# Patient Record
Sex: FEMALE | Race: BLACK OR AFRICAN AMERICAN | Employment: FULL TIME | URBAN - METROPOLITAN AREA
[De-identification: names, ages, dates, MRNs, and addresses within clinical notes are randomized per-mention and may not be internally consistent; named-entity substitution may affect disease eponyms.]

---

## 2017-07-14 ENCOUNTER — HOSPITAL ENCOUNTER (EMERGENCY)
Age: 52
Discharge: HOME | End: 2017-07-14
Attending: FAMILY MEDICINE

## 2017-07-14 VITALS
DIASTOLIC BLOOD PRESSURE: 83 MMHG | TEMPERATURE: 98.5 F | OXYGEN SATURATION: 99 % | HEIGHT: 65 IN | BODY MASS INDEX: 32.32 KG/M2 | WEIGHT: 194 LBS | HEART RATE: 100 BPM | RESPIRATION RATE: 16 BRPM | SYSTOLIC BLOOD PRESSURE: 137 MMHG

## 2017-07-14 DIAGNOSIS — J01.00 ACUTE NON-RECURRENT MAXILLARY SINUSITIS: Primary | ICD-10-CM

## 2017-07-14 RX ORDER — BUTALBITAL, ACETAMINOPHEN AND CAFFEINE 300; 40; 50 MG/1; MG/1; MG/1
1 CAPSULE ORAL
Qty: 20 CAP | Refills: 0 | Status: SHIPPED | OUTPATIENT
Start: 2017-07-14 | End: 2017-11-01

## 2017-07-14 RX ORDER — CEFDINIR 300 MG/1
300 CAPSULE ORAL 2 TIMES DAILY
Qty: 20 CAP | Refills: 0 | Status: SHIPPED | OUTPATIENT
Start: 2017-07-14 | End: 2017-11-01

## 2017-07-14 NOTE — UC PROVIDER NOTE
Patient is a 46 y.o. female presenting with sinus pain. The history is provided by the patient. Sinus Pain    This is a new problem. The current episode started more than 1 week ago. The problem has not changed since onset. There has been no fever. The pain is moderate. Associated symptoms include sweats, congestion and sinus pressure. Pertinent negatives include no chills, no sore throat, no swollen glands, no cough, no rhinorrhea, no neck pain, no neck pain, no headaches and no chest pain. She has tried nothing for the symptoms. History reviewed. No pertinent past medical history. Past Surgical History:   Procedure Laterality Date    HX GYN      hysterectomy    HX HYSTERECTOMY      Dr Abraham Lo         Family History   Problem Relation Age of Onset    Cancer Mother      breast    Hypertension Mother     Breast Cancer Mother     Hypertension Father     High Cholesterol Father     Cancer Paternal Aunt      cervical        Social History     Social History    Marital status: SINGLE     Spouse name: N/A    Number of children: N/A    Years of education: N/A     Occupational History    Not on file. Social History Main Topics    Smoking status: Former Smoker     Quit date: 1/2/2013    Smokeless tobacco: Not on file    Alcohol use No    Drug use: No    Sexual activity: Not on file     Other Topics Concern    Not on file     Social History Narrative                ALLERGIES: Banana; Shellfish derived; and Sulfa (sulfonamide antibiotics)    Review of Systems   Constitutional: Negative for chills. HENT: Positive for congestion and sinus pressure. Negative for rhinorrhea and sore throat. Respiratory: Negative for cough. Cardiovascular: Negative for chest pain. Musculoskeletal: Negative for neck pain. Neurological: Negative for headaches.        Vitals:    07/14/17 1840   BP: 137/83   Pulse: 100   Resp: 16   Temp: 98.5 °F (36.9 °C)   SpO2: 99%   Weight: 88 kg (194 lb)   Height: 5' 4.5\" (1.638 m)       Physical Exam   Constitutional: She is oriented to person, place, and time. She appears well-developed and well-nourished. HENT:   Right Ear: External ear normal.   Left Ear: External ear normal.   Eyes: Conjunctivae and EOM are normal.   Neck: Normal range of motion. Neck supple. Cardiovascular: Normal rate and regular rhythm. Pulmonary/Chest: Effort normal and breath sounds normal. No respiratory distress. She has no wheezes. She has no rales. She exhibits no tenderness. Musculoskeletal: Normal range of motion. Neurological: She is alert and oriented to person, place, and time. Skin: Skin is warm and dry. Psychiatric: She has a normal mood and affect. Her behavior is normal. Judgment and thought content normal.   Nursing note and vitals reviewed. MDM     Differential Diagnosis; Clinical Impression; Plan:     CLINICAL IMPRESSION:  Acute non-recurrent maxillary sinusitis  (primary encounter diagnosis)    Plan:  1. omnicef   2. fioricet  3. Risk of Significant Complications, Morbidity, and/or Mortality:   Presenting problems: Moderate  Diagnostic procedures: Moderate  Management options:   Moderate  Progress:   Patient progress:  Stable      Procedures

## 2017-07-14 NOTE — DISCHARGE INSTRUCTIONS
Sinusitis: Care Instructions  Your Care Instructions    Sinusitis is an infection of the lining of the sinus cavities in your head. Sinusitis often follows a cold. It causes pain and pressure in your head and face. In most cases, sinusitis gets better on its own in 1 to 2 weeks. But some mild symptoms may last for several weeks. Sometimes antibiotics are needed. Follow-up care is a key part of your treatment and safety. Be sure to make and go to all appointments, and call your doctor if you are having problems. It's also a good idea to know your test results and keep a list of the medicines you take. How can you care for yourself at home? · Take an over-the-counter pain medicine, such as acetaminophen (Tylenol), ibuprofen (Advil, Motrin), or naproxen (Aleve). Read and follow all instructions on the label. · If the doctor prescribed antibiotics, take them as directed. Do not stop taking them just because you feel better. You need to take the full course of antibiotics. · Be careful when taking over-the-counter cold or flu medicines and Tylenol at the same time. Many of these medicines have acetaminophen, which is Tylenol. Read the labels to make sure that you are not taking more than the recommended dose. Too much acetaminophen (Tylenol) can be harmful. · Breathe warm, moist air from a steamy shower, a hot bath, or a sink filled with hot water. Avoid cold, dry air. Using a humidifier in your home may help. Follow the directions for cleaning the machine. · Use saline (saltwater) nasal washes to help keep your nasal passages open and wash out mucus and bacteria. You can buy saline nose drops at a grocery store or drugstore. Or you can make your own at home by adding 1 teaspoon of salt and 1 teaspoon of baking soda to 2 cups of distilled water. If you make your own, fill a bulb syringe with the solution, insert the tip into your nostril, and squeeze gently. Emily Shames your nose.   · Put a hot, wet towel or a warm gel pack on your face 3 or 4 times a day for 5 to 10 minutes each time. · Try a decongestant nasal spray like oxymetazoline (Afrin). Do not use it for more than 3 days in a row. Using it for more than 3 days can make your congestion worse. When should you call for help? Call your doctor now or seek immediate medical care if:  · You have new or worse swelling or redness in your face or around your eyes. · You have a new or higher fever. Watch closely for changes in your health, and be sure to contact your doctor if:  · You have new or worse facial pain. · The mucus from your nose becomes thicker (like pus) or has new blood in it. · You are not getting better as expected. Where can you learn more? Go to http://vadim-lance.info/. Enter L409 in the search box to learn more about \"Sinusitis: Care Instructions. \"  Current as of: July 29, 2016  Content Version: 11.3  © 4046-7858 [employer]wise, Incorporated. Care instructions adapted under license by [employer] (which disclaims liability or warranty for this information). If you have questions about a medical condition or this instruction, always ask your healthcare professional. [place] [address] any warranty or liability for your use of this information.

## 2017-10-23 ENCOUNTER — HOSPITAL ENCOUNTER (OUTPATIENT)
Dept: MAMMOGRAPHY | Age: 52
Discharge: HOME | End: 2017-10-23
Attending: OBSTETRICS & GYNECOLOGY
Payer: COMMERCIAL

## 2017-10-23 DIAGNOSIS — Z12.31 VISIT FOR SCREENING MAMMOGRAM: ICD-10-CM

## 2017-10-23 PROCEDURE — 77067 SCR MAMMO BI INCL CAD: CPT

## 2018-02-15 ENCOUNTER — HOSPITAL ENCOUNTER (OUTPATIENT)
Dept: PHYSICAL THERAPY | Age: 53
Discharge: HOME | End: 2018-02-15
Payer: COMMERCIAL

## 2018-02-15 PROCEDURE — 97162 PT EVAL MOD COMPLEX 30 MIN: CPT

## 2018-02-15 PROCEDURE — 97110 THERAPEUTIC EXERCISES: CPT

## 2018-02-15 PROCEDURE — 97140 MANUAL THERAPY 1/> REGIONS: CPT

## 2018-02-15 NOTE — PROGRESS NOTES
PT INITIAL EVALUATION NOTE 2-15    Patient Name: Chan Ghosh  Date:2/15/2018  : 1965  [x]  Patient  Verified  Payor: Min Polk / Plan: Ady Leon / Product Type: PPO /    In time:12:24 PM  Out time:1:27 PM  Total Treatment Time (min): 63 minutes  Visit #: 1     Treatment Area: Low back pain [M54.5]    SUBJECTIVE  Pain Level (0-10 scale): 8/10  Any medication changes, allergies to medications, adverse drug reactions, diagnosis change, or new procedure performed?: [] No    [x] Yes (see summary sheet for update)  Subjective:     Pt referred to PT for bilateral LBP and sciatica. Pt reports primary complaints of numbness/tingling in L buttock down posterior thigh to knee with intermittent low back pain beginning again in 2017. Symptoms were exacerbated 2 weeks ago when she bent over, and could not stand back up due to L leg feeling [employer] it was out of place. \"  Aggravating factors include increased tightness first thing in the morning, standing after prolonged sitting, standing. Pt is an avid walker; legs feel tight bilaterally at beginning and loosen up after prolonged walking. 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 Relieving factors include heat in low back (min effect), stretching. X-rays show no significant abnormalities. She is scheduled to see a back and spine specialist next week. Pt lives in a 2-story house with stairs. Sleeping is occasionally affected when changing positions in bed, waking up intermittently. Pt goals include reducing pain and participating in the [facility] 10K in April. OBJECTIVE/EXAMINATION  Posture:  Slouched posture  Other Observations:  L/R Backward Sacral Torsion; Pubic dysfunction   Gait and Functional Mobility:  Genu varus bilaterally; tibial ER bilaterally    Lumbar ROM:   Flexion: Mod limitation due to tightness   Extension: Mod limitation p! Side Bending: Right: Min limitation  Left: Min limitation p!    Rotation: Right: Min-mod limitation  Left: Mod limitation    Balance Assessment: Min deficits in balance and neuromuscular control in single limb stance    Squat Assessment:   Double Leg Genu varus; quad dominant   Single Leg NT    Neurological: Sensations: Intact bilaterally with light touch (L2-S1)    Flexibility: Mod-severe restrictions in hamstrings, quadriceps, iliopsoas bilaterally L>R      LOWER QUARTER   MUSCLE STRENGTH  KEY       R  L  0 - No Contraction  Hip flex   4-/5  3+/5  1 - Trace          er    NT  NT  2 - Poor          ir   NT  NT  3 - Fair           abd  4-/5  3+/5  4 - Good          ext   4/5  3+/5  5 - Normal   Knee flex  5/5  5/5               Ext  5/5  5/5      Ankle DF  5/5  5/5                PF  5/5  5/5                INV  NT  NT                EV  NT  NT  Gluteal activation: The Pt has improper gluteal activation with hip extension bilaterally L>R    Joint Mobility Assessment: SIJ dysfunction  Palpation: TTP and increased turgor in piriformis bilaterally L>R; Increased turgor in QL and lumbar paraspinals bilaterally    Special Tests:Varus: NT     SLR: (-)    Valgus: (-)     Slump: (-)    Sergio's: (-)    Harjit: NT    Anterior Drawer: NT    Obers: NT    Posterior Drawer: NT    Clonus: NT    Lachman's: NT    Others: (+) Sitting FF test; (+) Sitting lumbar EXT test; (-) Sitting lumbar flexion test     14 min Therapeutic Exercise:  [x] See flow sheet :   Rationale: increase ROM, increase strength, improve coordination and increase proprioception to improve the patients ability to perform ADLs with decreased pain or discomfort. 11 min Manual Therapy:  Shot-gun technique; MET for L/R Backward Sacral Torsion   Rationale: decrease pain, increase ROM, increase tissue extensibility and increase postural awareness  to improve the patients ability to perform ADLs with decreased pain or discomfort.     With   [x] TE   [] TA   [] neuro   [x] other: Patient Education: [x] Review HEP    [] Progressed/Changed HEP based on:   [] positioning [] body mechanics   [] transfers   [] heat/ice application    [x] other: Educated patient regarding diagnosis, prognosis, and POC.        Other Objective/Functional Measures:  FOTO score: 47/100    Pain Level (0-10 scale) post treatment: 4/10      ASSESSMENT:      [x]  See Plan of [address] 2/15/2018  11:13 AM

## 2018-02-15 NOTE — PROGRESS NOTES
[address] [place] [address] (MOB IV), [address] [facility] [place]  [place] [address]  Phone: [phone] Fax: [phone]    Plan of Care/Statement of Necessity for Physical Therapy Services  2-15    Patient name: Terrence Sheppard  : 1965  Provider#: [id number]  Referral source: Herlinda Sarah MD      Medical/Treatment Diagnosis: Low back pain [M54.5]     Prior Hospitalization: see medical history     Comorbidities: BMI > 30  Prior Level of Function: The patient completed 20 minutes of exercise at least 3 times a week. Medications: Verified on Patient Summary List    Start of Care: 2/15/2018      Onset Date: 2017       The Plan of Care and following information is based on the information from the initial evaluation. Assessment/ key information: Patient is a pleasant 46year old female referred to skilled PT for bilateral low back pain and bilateral sciatica. Patient reports primary complaints of a numbness/tingling sensation in L buttock down posterior thigh to knee with intermittent low back pain re-appearing in 2017. Symptoms were further exacerbated 2 weeks ago attempting to extend back up after bending forward. Based on examination, patient presents with SIJ dysfunction and malalignment with radiating sciatica pain L>R and demonstrates several impairments including a left on right backward sacral torsion, significantly weak hip musculature bilaterally, decreased lumbar mobility, significantly decreased hip flexibility bilaterally (L>R). The patient would benefit from skilled physical therapy to help improve the above listed impairments to allow the patient to safely return to their prior level of function with less overall pain or risk of further injury. The patient has a good prognosis with skilled physical therapy.     Evaluation Complexity History MEDIUM  Complexity : 1-2 comorbidities / personal factors will impact the outcome/ POC ; Examination HIGH Complexity : 4+ Standardized tests and measures addressing body structure, function, activity limitation and / or participation in recreation  ;Presentation MEDIUM Complexity : Evolving with changing characteristics  ; Clinical Decision Making MEDIUM Complexity : FOTO score of 26-74  Overall Complexity Rating: MEDIUM    Problem List: pain affecting function, decrease ROM, decrease strength, edema affecting function, impaired gait/ balance, decrease ADL/ functional abilitiies, decrease activity tolerance, decrease flexibility/ joint mobility and decrease transfer abilities   Treatment Plan may include any combination of the following: Therapeutic exercise, Therapeutic activities, Neuromuscular re-education, Physical agent/modality, Gait/balance training, Manual therapy, Patient education, Self Care training, Functional mobility training, Home safety training and Stair training  Patient / Family readiness to learn indicated by: asking questions, trying to perform skills and interest  Persons(s) to be included in education: patient (P)  Barriers to Learning/Limitations: None  Patient Goal (s): Minimize pain  Patient Self Reported Health Status: good  Rehabilitation Potential: good    Short Term Goals: To be accomplished in 6 treatments:   1. Patient will be independent and compliant with HEP. Long Term Goals: To be accomplished in 12 treatments:   1. Patient will improve FOTO score by the MCII demonstrating improved overall function with decreased pain. 2. Patient will be able to perform 10 consecutive sit to stands without complaints of increased pain in order to improve functional mobility. 3. Patient will be able to stand for >=10 minutes without complaints of pain or numbness/tingling in order to better tolerate functional tasks with decreased pain. 4. Patient will be able to participate in the [facility] in April with no pain >2/10 in order to increase participation in community events.      Frequency / Duration: Patient to be seen 1 times per week for 12 treatments. Patient/ Caregiver education and instruction: self care, activity modification and exercises    []  Plan of care has been reviewed with ERIKA Castillo 2/15/2018 12:05 PM    ________________________________________________________________________    I certify that the above Therapy Services are being furnished while the patient is under my care. I agree with the treatment plan and certify that this therapy is necessary.     [de-identified] Signature:____________________  Date:____________Time: _________

## 2018-02-19 ENCOUNTER — HOSPITAL ENCOUNTER (OUTPATIENT)
Dept: PHYSICAL THERAPY | Age: 53
Discharge: HOME | End: 2018-02-19
Payer: COMMERCIAL

## 2018-02-19 PROCEDURE — 97110 THERAPEUTIC EXERCISES: CPT

## 2018-02-19 PROCEDURE — 97140 MANUAL THERAPY 1/> REGIONS: CPT

## 2018-02-19 NOTE — PROGRESS NOTES
PT DAILY TREATMENT NOTE 2-15    Patient Name: Aníbal Walden  Date:2018  : 1965  [x]  Patient  Verified  Payor: Sussy Harrison / Plan: Felicia Barclay / Product Type: PPO /    In time:4:00 PM  Out time:4:54 PM  Total Treatment Time (min): 54 minutes  Visit #: 2     Treatment Area: Low back pain [M54.5]    SUBJECTIVE  Pain Level (0-10 scale): 3/10  Any medication changes, allergies to medications, adverse drug reactions, diagnosis change, or new procedure performed?: [x] No    [] Yes (see summary sheet for update)  Subjective functional status/changes:   [] No changes reported  Patient feels much better, and states that the \"pop\" she felt with the shot-gun technique in [facility] gave her much relief. She did extensive walking (4 miles) on Saturday and went to work afterwards with very min complaints of discomfort. She was on her feet at [employer] all day on  with no complaints of pain. OBJECTIVE    39 min Therapeutic Exercise:  [x] See flow sheet :   Rationale: increase ROM, increase strength, improve coordination and increase proprioception to improve the patients ability to perform ADLs with decreased pain or discomfort. 15 min Manual Therapy:  Shot-gun technique; MET for R anteriorly rotated innominate   Rationale: decrease pain, increase ROM, increase tissue extensibility, decrease trigger points and increase postural awareness  to improve the patients ability to perform ADLs with decreased pain or discomfort.           With   [x] TE   [] TA   [] neuro   [x] other: Patient Education: [x] Review HEP    [] Progressed/Changed HEP based on:   [] positioning   [] body mechanics   [] transfers   [] heat/ice application    [x] other: Instructed patient in performing self-MET for R anteriorly rotated innominate     Other Objective/Functional Measures: None noted     Pain Level (0-10 scale) post treatment: 1/10    ASSESSMENT/Changes in Function:   Pt demonstrated R leg length > L leg length. Demonstrated improvement post-shot-gun technique, but not completely corrected alignment. Performed MET for R anteriorly rotated innominate which corrected leg length discrepancy. She was able to tolerate all exercises without any complaints of pain. Patient will continue to benefit from skilled PT services to modify and progress therapeutic interventions, address functional mobility deficits, address ROM deficits, address strength deficits, analyze and address soft tissue restrictions, analyze and cue movement patterns, analyze and modify body mechanics/ergonomics, assess and modify postural abnormalities and instruct in home and community integration to attain remaining goals. []  See Plan of Care  []  See progress note/recertification  []  See Discharge Summary         Progress towards goals / Updated goals:  Short Term Goals: To be accomplished in 6 treatments:                         1. Patient will be independent and compliant with HEP. - progressing  Long Term Goals: To be accomplished in 12 treatments:                         1. Patient will improve FOTO score by the MCII demonstrating improved overall function with decreased pain. - progressing                         2. Patient will be able to perform 10 consecutive sit to stands without complaints of increased pain in order to improve functional mobility. - progressing                         3. Patient will be able to stand for >=10 minutes without complaints of pain or numbness/tingling in order to better tolerate functional tasks with decreased pain. - progressing                         4. Patient will be able to participate in the [facility] in April with no pain >2/10 in order to increase participation in community events.   - progressing    PLAN  [x]  Upgrade activities as tolerated     [x]  Continue plan of care  [x]  Update interventions per flow sheet       []  Discharge due to:_  []  Other:_      Teresa Rodriguez Dontae 2/19/2018  2:48 PM

## 2018-02-26 ENCOUNTER — HOSPITAL ENCOUNTER (OUTPATIENT)
Dept: PHYSICAL THERAPY | Age: 53
Discharge: HOME | End: 2018-02-26
Payer: COMMERCIAL

## 2018-02-26 PROCEDURE — 97110 THERAPEUTIC EXERCISES: CPT

## 2018-02-26 PROCEDURE — 97140 MANUAL THERAPY 1/> REGIONS: CPT

## 2018-02-26 NOTE — PROGRESS NOTES
PT DAILY TREATMENT NOTE 2-15    Patient Name: Lazaro Bolden  Date:2018  : 1965  [x]  Patient  Verified  Payor: Brooke Rios / Plan: Ema Lewis / Product Type: PPO /    In time: 4:03 PM  Out time: 4:55 PM  Total Treatment Time (min): 52 minutes  Visit #: 3     Treatment Area: Low back pain [M54.5]    SUBJECTIVE  Pain Level (0-10 scale): 2/10  Any medication changes, allergies to medications, adverse drug reactions, diagnosis change, or new procedure performed?: [x] No    [] Yes (see summary sheet for update)  Subjective functional status/changes:   [] No changes reported  The Pt went to see Dr. Gregorio Valiente who per PT report said that it could possibly be muscle spasms or a pinched nerve. He prescribed muscle relaxers and Meloxicam, but she states that both medications make her incredibly tired. She states that she is still stiff in the morning, but it is less intense. She continues to have some numbness/tingling, but it is less intense and less consistent. She continues to be diligent with her HEP. OBJECTIVE    42 min Therapeutic Exercise:  [x] See flow sheet :   Rationale: increase ROM, increase strength, improve coordination, improve balance and increase proprioception to improve the patients ability to Perform ADLs with less pain or discomfort. 10 min Manual Therapy:  Shot gun technique and MET for a R anteriorly rotated innominate x2   Rationale: decrease pain, increase ROM and increase tissue extensibility  to improve the patients ability to Perform ADLs with less pain or discomfort.     With   [x] TE   [] TA   [] neuro   [] other: Patient Education: [x] Review HEP    [] Progressed/Changed HEP based on:   [] positioning   [] body mechanics   [] transfers   [] heat/ice application    [] other:      Other Objective/Functional Measures: None noted     Pain Level (0-10 scale) post treatment: 0/10    ASSESSMENT/Changes in Function:   The Pt tolerated the additions to her therapy program well without any increased pain or discomfort. She is progressing well with her core strengthening and does not fatigue as quickly. Patient will continue to benefit from skilled PT services to modify and progress therapeutic interventions, address functional mobility deficits, address ROM deficits, address strength deficits, analyze and address soft tissue restrictions, analyze and cue movement patterns, analyze and modify body mechanics/ergonomics, assess and modify postural abnormalities and instruct in home and community integration to attain remaining goals. []  See Plan of Care  []  See progress note/recertification  []  See Discharge Summary         Progress towards goals / Updated goals:  Short Term Goals: To be accomplished in 6 treatments:                         1. Patient will be independent and compliant with HEP. - progressing  Long Term Goals: To be accomplished in 12 treatments:                         1. Patient will improve FOTO score by the MCII demonstrating improved overall function with decreased pain. - progressing                         3. Patient will be able to perform 10 consecutive sit to stands without complaints of increased pain in order to improve functional mobility. - progressing                         8. Patient will be able to stand for >=10 minutes without complaints of pain or numbness/tingling in order to better tolerate functional tasks with decreased pain.   - progressing                         3. Patient will be able to participate in the [facility] in April with no pain >2/10 in order to increase participation in community events.   - progressing     PLAN  [x]  Upgrade activities as tolerated     [x]  Continue plan of care  [x]  Update interventions per flow sheet       []  Discharge due to:_  []  Other:_      Kelly Stephenson, PT 2/26/2018  4:09 PM

## 2018-03-05 ENCOUNTER — HOSPITAL ENCOUNTER (OUTPATIENT)
Dept: PHYSICAL THERAPY | Age: 53
Discharge: HOME | End: 2018-03-05
Payer: COMMERCIAL

## 2018-03-05 PROCEDURE — 97110 THERAPEUTIC EXERCISES: CPT

## 2018-03-05 PROCEDURE — 97140 MANUAL THERAPY 1/> REGIONS: CPT

## 2018-03-05 NOTE — PROGRESS NOTES
PT DAILY TREATMENT NOTE 2-15    Patient Name: Marya Ayala  Date:3/5/2018  : 1965  [x]  Patient  Verified  Payor: Kun Headley / Plan: Simone Rodas / Product Type: PPO /    In time: 4:35 PM  Out time: 5:42 PM  Total Treatment Time (min): 67 minutes  Visit #: 4     Treatment Area: Low back pain [M54.5]    SUBJECTIVE  Pain Level (0-10 scale): 0/10  Any medication changes, allergies to medications, adverse drug reactions, diagnosis change, or new procedure performed?: [x] No    [] Yes (see summary sheet for update)  Subjective functional status/changes:   [] No changes reported  The Pt reports that she has no pain during the days and only has pain when she wakes up in the morning. She has been trying to stretch more in the morning and that helps to loosen her up. She states that it always feels tight and stiff in the morning, but after being up for 5-10 minutes it loosens up and she is fine. OBJECTIVE    52 min Therapeutic Exercise:  [x] See flow sheet :   Rationale: increase ROM, increase strength, improve coordination, improve balance and increase proprioception to improve the patients ability to perform ADLs and recreational activities with less pain or discomfort. 15 min Manual Therapy:  STM and trigger point release to R piriformis using the elbow   Rationale: decrease pain, increase ROM, increase tissue extensibility and decrease trigger points  to improve the patients ability to perform ADLs and recreational activities with less pain or discomfort.           With   [x] TE   [] TA   [] neuro   [] other: Patient Education: [x] Review HEP    [] Progressed/Changed HEP based on:   [] positioning   [] body mechanics   [] transfers   [] heat/ice application    [] other:      Other Objective/Functional Measures: None noted     Pain Level (0-10 scale) post treatment: 0/10    ASSESSMENT/Changes in Function:   The Pt's alignment was found to be [facility], but there was significant turgor and discomfort in the R piriformis. She responded very well to the manual therapy and reported feeling less restricted and tight after. She is progressing well with her exercises and is able to maintain proper form and technique more easily and does not fatigue as quickly. Patient will continue to benefit from skilled PT services to modify and progress therapeutic interventions, address functional mobility deficits, address ROM deficits, address strength deficits, analyze and address soft tissue restrictions, analyze and cue movement patterns, analyze and modify body mechanics/ergonomics, assess and modify postural abnormalities and instruct in home and community integration to attain remaining goals. []  See Plan of Care  []  See progress note/recertification  []  See Discharge Summary         Progress towards goals / Updated goals:  Short Term Goals: To be accomplished in 6 treatments:                         1. Patient will be independent and compliant with HEP.  - progressing  Long Term Goals: To be accomplished in 12 treatments:                         1.  Patient will improve FOTO score by the MCII demonstrating improved overall function with decreased pain.  - progressing                         5. Patient will be able to perform 10 consecutive sit to stands without complaints of increased pain in order to improve functional mobility.  - progressing                         3. Patient will be able to stand for >=10 minutes without complaints of pain or numbness/tingling in order to better tolerate functional tasks with decreased pain.   - progressing                         1. Patient will be able to participate in the [facility] in April with no pain >2/10 in order to increase participation in community events.  - progressing     PLAN  [x]  Upgrade activities as tolerated     [x]  Continue plan of care  [x]  Update interventions per flow sheet       []  Discharge due to:_  []  Other:_ Danielito Bennett, PT 3/5/2018  5:17 PM

## 2018-03-12 ENCOUNTER — HOSPITAL ENCOUNTER (OUTPATIENT)
Dept: PHYSICAL THERAPY | Age: 53
Discharge: HOME | End: 2018-03-12
Payer: COMMERCIAL

## 2018-03-12 PROCEDURE — 97110 THERAPEUTIC EXERCISES: CPT

## 2018-03-12 PROCEDURE — 97140 MANUAL THERAPY 1/> REGIONS: CPT

## 2018-03-12 NOTE — PROGRESS NOTES
PT DAILY TREATMENT NOTE 2-15    Patient Name: Kaci Powell  Date:3/12/2018  : 1965  [x]  Patient  Verified  Payor: Shanna Avila / Plan: Angelica Danielle / Product Type: PPO /    In time: 4:10 PM  Out time: 5:18 PM  Total Treatment Time (min): 68 minutes  Visit #: 5     Treatment Area: Low back pain [M54.5]    SUBJECTIVE  Pain Level (0-10 scale): 0/10  Any medication changes, allergies to medications, adverse drug reactions, diagnosis change, or new procedure performed?: [x] No    [] Yes (see summary sheet for update)  Subjective functional status/changes:   [] No changes reported  The Pt reports that her back is feeling much better and not causing her as much discomfort with her ADLs or with work duties. She continues to report diligence with her HEP. OBJECTIVE    58 min Therapeutic Exercise:  [x] See flow sheet :   Rationale: increase ROM, increase strength, improve coordination, improve balance and increase proprioception to improve the patients ability to perform ADLs with less pain or discomfort. 10 min Manual Therapy:  Shot gun technique and MET for a R anteriorly rotated innominate   Rationale: decrease pain, increase ROM, increase tissue extensibility and decrease trigger points  to improve the patients ability to perform ADLs with less pain or discomfort. With   [x] TE   [] TA   [] neuro   [] other: Patient Education: [x] Review HEP    [] Progressed/Changed HEP based on:   [] positioning   [] body mechanics   [] transfers   [] heat/ice application    [] other:      Other Objective/Functional Measures: None noted. Pain Level (0-10 scale) post treatment: 0/10    ASSESSMENT/Changes in Function:   The Pt's innominate malalignment corrected very easy and was mild compared to previous malalignments. She is progressing well with her strengthening exercises and towards her goals. Anticipate discharge after next PT session.   Patient will continue to benefit from skilled PT services to modify and progress therapeutic interventions, address functional mobility deficits, address ROM deficits, address strength deficits, analyze and address soft tissue restrictions, analyze and cue movement patterns, analyze and modify body mechanics/ergonomics, assess and modify postural abnormalities and instruct in home and community integration to attain remaining goals. []  See Plan of Care  []  See progress note/recertification  []  See Discharge Summary         Progress towards goals / Updated goals:  Short Term Goals: To be accomplished in 6 treatments:                         1. Patient will be independent and compliant with HEP.  - progressing  Long Term Goals: To be accomplished in 12 treatments:                         1.  Patient will improve FOTO score by the MCII demonstrating improved overall function with decreased pain.  - progressing                         2. Patient will be able to perform 10 consecutive sit to stands without complaints of increased pain in order to improve functional mobility.  - progressing                         3. Patient will be able to stand for >=10 minutes without complaints of pain or numbness/tingling in order to better tolerate functional tasks with decreased pain.   - progressing                         0. Patient will be able to participate in the [facility] in April with no pain >2/10 in order to increase participation in community events.  - progressing     PLAN  [x]  Upgrade activities as tolerated     [x]  Continue plan of care  [x]  Update interventions per flow sheet       []  Discharge due to:_  []  Other:_      Mere Ralph PT 3/12/2018  2:55 PM

## 2018-03-19 ENCOUNTER — HOSPITAL ENCOUNTER (OUTPATIENT)
Dept: PHYSICAL THERAPY | Age: 53
Discharge: HOME | End: 2018-03-19
Payer: COMMERCIAL

## 2018-03-19 PROCEDURE — 97140 MANUAL THERAPY 1/> REGIONS: CPT

## 2018-03-19 PROCEDURE — 97110 THERAPEUTIC EXERCISES: CPT

## 2018-03-19 NOTE — ANCILLARY DISCHARGE INSTRUCTIONS
[facility]  [address] [place]  Phone: [phone] Fax: [phone]    Discharge Summary  2-15    Patient name: Elise Barnett  : 1965  Provider#: [id number]  Referral source: Renetta Christian MD      Medical/Treatment Diagnosis: Low back pain [M54.5]     Prior Hospitalization: see medical history     Comorbidities: See Plan of Care  Prior Level of Function:See Plan of Care  Medications: Verified on Patient Summary List    Start of Care: 2/15/18      Onset Date: 2017   Visits from Start of Care: 6     Missed Visits: 0  Reporting Period : 2/15/18 to 3/19/18      ASSESSMENT/SUMMARY OF CARE: The Pt has been seen for 6 outpatient physical therapy sessions with the diagnosis of bilateral LBP with bilateral sciatica. The Pt has met all of her therapeutic goals and progressed very well with her therapy program that focused on improving her hip strength and stability, improving her core strength and stability, correcting her innominate and sacral alignments, reducing the turgor and restrictions in her R piriformis, stretching her lower back and hip musculature, improving her balance and neuromuscular control, and improving her activity tolerance and endurance via therapeutic exercises and manual therapy techniques. The Pt has been able to return to walking for recreation without any pain or discomfort and is now able to walk up to 5 miles without discomfort. She is able to perform all ADLs and work duties without pain or discomfort. She denies any functional limitations and overall believes that she is 99% improved since beginning therapy. At this time, it is believed that the Pt has reached maximum benefit from skilled PT and will continue to progress well independently. The Pt was educated how to safely progress her HEP and voiced understanding.   The Pt is discharged from skilled PT and will contact her referring provider with any further questions or concerns. Thank you for this referral.    Progress towards goals / Updated goals:  Short Term Goals: To be accomplished in 6 treatments:                         1. Patient will be independent and compliant with HEP.  - met  Long Term Goals: To be accomplished in 12 treatments:                         1.  Patient will improve FOTO score by the MCII demonstrating improved overall function with decreased pain- met (FOTO 94/100 at discharge                         2. Patient will be able to perform 10 consecutive sit to stands without complaints of increased pain in order to improve functional mobility.  - met                         3. Patient will be able to stand for >=10 minutes without complaints of pain or numbness/tingling in order to better tolerate functional tasks with decreased pain.   - met                         2. Patient will be able to participate in the [facility] in April with no pain >2/10 in order to increase participation in community events.  - met (able to walk 5 miles without pain or discomfort)     RECOMMENDATIONS:  [x]Discontinue therapy: [x]Patient has reached or is progressing toward set goals      []Patient is non-compliant or has abdicated      []Due to lack of appreciable progress towards set goals      []Other    Júnior Whitmore, PT 3/19/2018 7:32 PM

## 2018-03-19 NOTE — PROGRESS NOTES
PT DAILY TREATMENT NOTE 2-15    Patient Name: Eliza Mckenzie  Date:3/19/2018  : 1965  [x]  Patient  Verified  Payor: Darren Zayas / Plan: Lisa Vincent / Product Type: PPO /    In time: 3:57 PM  Out time: 5:05 PM  Total Treatment Time (min): 68 minutes  Visit #: 6     Treatment Area: Low back pain [M54.5]    SUBJECTIVE  Pain Level (0-10 scale): 0/10  Any medication changes, allergies to medications, adverse drug reactions, diagnosis change, or new procedure performed?: [x] No    [] Yes (see summary sheet for update)  Subjective functional status/changes:   [] No changes reported  The Pt reports that she has been sick for the last week with a sinus infection. Despite being sick, she has not had any LBP or R piriformis pain or discomfort. She was able to walk for 4 miles straight without any pain or discomfort. She believes that she is overall 99% improved since beginning therapy. OBJECTIVE    58 min Therapeutic Exercise:  [x] See flow sheet :   Rationale: increase ROM, increase strength, improve coordination, improve balance and increase proprioception to improve the patients ability to perform ADLs with less pain or discomfort. 10 min Manual Therapy:  STM and trigger point release to R piriformis using the elbow   Rationale: decrease pain, increase ROM, increase tissue extensibility and decrease trigger points  to improve the patients ability to perform ADLs with less pain or discomfort.     With   [x] TE   [] TA   [] neuro   [x] other: Patient Education: [x] Review HEP    [] Progressed/Changed HEP based on:   [] positioning   [] body mechanics   [] transfers   [] heat/ice application    [x] other: Pt educated how to safely progress her HEP independently      Other Objective/Functional Measures:  FOTO 94/100 (47/100 at initial evaluation)     Pain Level (0-10 scale) post treatment: 0/10    ASSESSMENT/Changes in Function:      []  See Plan of Care  []  See progress note/recertification  [x]  See Discharge Summary         Progress towards goals / Updated goals:  Short Term Goals: To be accomplished in 6 treatments:                         1. Patient will be independent and compliant with HEP.  - met  Long Term Goals: To be accomplished in 12 treatments:                         1.  Patient will improve FOTO score by the MCII demonstrating improved overall function with decreased pain- met (FOTO 94/100 at discharge                         2. Patient will be able to perform 10 consecutive sit to stands without complaints of increased pain in order to improve functional mobility.  - met                         3. Patient will be able to stand for >=10 minutes without complaints of pain or numbness/tingling in order to better tolerate functional tasks with decreased pain.   - met                         9. Patient will be able to participate in the [facility] in April with no pain >2/10 in order to increase participation in community events.  - met (able to walk 5 miles without pain or discomfort)     PLAN  []  Upgrade activities as tolerated     []  Continue plan of care  []  Update interventions per flow sheet       [x]  Discharge due to: Pt has met therapeutic goals  []  Other:_      Diane La PT 3/19/2018  2:57 PM

## 2018-10-24 ENCOUNTER — HOSPITAL ENCOUNTER (OUTPATIENT)
Dept: MAMMOGRAPHY | Age: 53
Discharge: HOME | End: 2018-10-24
Attending: FAMILY MEDICINE
Payer: COMMERCIAL

## 2018-10-24 DIAGNOSIS — Z12.39 SCREENING BREAST EXAMINATION: ICD-10-CM

## 2018-10-24 PROCEDURE — 77063 BREAST TOMOSYNTHESIS BI: CPT

## 2019-02-05 PROBLEM — E66.01 SEVERE OBESITY (HCC): Status: ACTIVE | Noted: 2019-02-05

## 2019-02-11 ENCOUNTER — HOSPITAL ENCOUNTER (OUTPATIENT)
Dept: MRI IMAGING | Age: 54
Discharge: HOME | End: 2019-02-11
Attending: FAMILY MEDICINE
Payer: COMMERCIAL

## 2019-02-11 DIAGNOSIS — M54.42 ACUTE MIDLINE LOW BACK PAIN WITH LEFT-SIDED SCIATICA: ICD-10-CM

## 2019-02-11 PROCEDURE — 72148 MRI LUMBAR SPINE W/O DYE: CPT

## 2019-10-25 ENCOUNTER — HOSPITAL ENCOUNTER (OUTPATIENT)
Dept: MAMMOGRAPHY | Age: 54
Discharge: HOME | End: 2019-10-25
Attending: FAMILY MEDICINE
Payer: COMMERCIAL

## 2019-10-25 DIAGNOSIS — Z12.31 VISIT FOR SCREENING MAMMOGRAM: ICD-10-CM

## 2019-10-25 PROCEDURE — 77067 SCR MAMMO BI INCL CAD: CPT

## 2019-12-15 ENCOUNTER — OFFICE VISIT (OUTPATIENT)
Dept: URGENT CARE | Age: 54
End: 2019-12-15

## 2020-10-05 ENCOUNTER — TRANSCRIBE ORDER (OUTPATIENT)
Dept: SCHEDULING | Age: 55
End: 2020-10-05

## 2020-10-05 DIAGNOSIS — Z12.31 VISIT FOR SCREENING MAMMOGRAM: Primary | ICD-10-CM

## 2020-10-26 ENCOUNTER — HOSPITAL ENCOUNTER (OUTPATIENT)
Dept: MAMMOGRAPHY | Age: 55
Discharge: HOME | End: 2020-10-26
Attending: FAMILY MEDICINE
Payer: COMMERCIAL

## 2020-10-26 DIAGNOSIS — Z12.31 VISIT FOR SCREENING MAMMOGRAM: ICD-10-CM

## 2020-10-26 PROCEDURE — 77063 BREAST TOMOSYNTHESIS BI: CPT

## 2021-10-13 ENCOUNTER — HOSPITAL ENCOUNTER (OUTPATIENT)
Dept: MAMMOGRAPHY | Age: 56
Discharge: HOME | End: 2021-10-13
Attending: FAMILY MEDICINE
Payer: COMMERCIAL

## 2021-10-13 DIAGNOSIS — Z12.39 BREAST CANCER SCREENING: ICD-10-CM

## 2021-10-13 PROCEDURE — 77063 BREAST TOMOSYNTHESIS BI: CPT

## 2022-03-19 PROBLEM — E66.01 SEVERE OBESITY (HCC): Status: ACTIVE | Noted: 2019-02-05

## 2022-09-20 ENCOUNTER — TRANSCRIBE ORDER (OUTPATIENT)
Dept: SCHEDULING | Age: 57
End: 2022-09-20

## 2022-09-20 DIAGNOSIS — Z12.31 VISIT FOR SCREENING MAMMOGRAM: Primary | ICD-10-CM

## 2022-10-17 ENCOUNTER — HOSPITAL ENCOUNTER (OUTPATIENT)
Dept: MAMMOGRAPHY | Age: 57
Discharge: HOME | End: 2022-10-17
Attending: FAMILY MEDICINE
Payer: COMMERCIAL

## 2022-10-17 DIAGNOSIS — Z12.31 VISIT FOR SCREENING MAMMOGRAM: ICD-10-CM

## 2022-10-17 PROCEDURE — 77063 BREAST TOMOSYNTHESIS BI: CPT

## 2023-05-18 PROBLEM — F32.1 CURRENT MODERATE EPISODE OF MAJOR DEPRESSIVE DISORDER WITHOUT PRIOR EPISODE (HCC): Status: ACTIVE | Noted: 2023-05-18

## 2023-09-03 ENCOUNTER — OFFICE VISIT (OUTPATIENT)
Age: 58
End: 2023-09-03

## 2023-09-03 VITALS
DIASTOLIC BLOOD PRESSURE: 79 MMHG | WEIGHT: 219.5 LBS | BODY MASS INDEX: 37.47 KG/M2 | SYSTOLIC BLOOD PRESSURE: 140 MMHG | HEART RATE: 84 BPM | OXYGEN SATURATION: 96 % | RESPIRATION RATE: 20 BRPM | HEIGHT: 64 IN

## 2023-09-03 DIAGNOSIS — J06.9 UPPER RESPIRATORY TRACT INFECTION, UNSPECIFIED TYPE: ICD-10-CM

## 2023-09-03 DIAGNOSIS — H61.22 EXCESSIVE CERUMEN IN LEFT EAR CANAL: Primary | ICD-10-CM

## 2023-09-03 DIAGNOSIS — H93.8X2 CONGESTION OF LEFT EAR: ICD-10-CM

## 2023-09-03 ASSESSMENT — ENCOUNTER SYMPTOMS
COUGH: 1
SORE THROAT: 0
RHINORRHEA: 1
SINUS PAIN: 1
SWOLLEN GLANDS: 0

## 2023-10-20 ENCOUNTER — HOSPITAL ENCOUNTER (OUTPATIENT)
Facility: HOSPITAL | Age: 58
Discharge: HOME | End: 2023-10-20
Attending: FAMILY MEDICINE
Payer: COMMERCIAL

## 2023-10-20 DIAGNOSIS — Z12.31 SCREENING MAMMOGRAM FOR HIGH-RISK PATIENT: ICD-10-CM

## 2023-10-20 PROCEDURE — 77063 BREAST TOMOSYNTHESIS BI: CPT

## 2024-08-16 ENCOUNTER — OFFICE VISIT (OUTPATIENT)
Age: 59
End: 2024-08-16

## 2024-08-16 VITALS
HEART RATE: 112 BPM | WEIGHT: 206 LBS | DIASTOLIC BLOOD PRESSURE: 91 MMHG | SYSTOLIC BLOOD PRESSURE: 160 MMHG | OXYGEN SATURATION: 98 % | TEMPERATURE: 98.6 F | HEIGHT: 64 IN | BODY MASS INDEX: 35.17 KG/M2 | RESPIRATION RATE: 18 BRPM

## 2024-08-16 DIAGNOSIS — B96.89 ACUTE BACTERIAL SINUSITIS: Primary | ICD-10-CM

## 2024-08-16 DIAGNOSIS — R03.0 ELEVATED BLOOD PRESSURE READING: ICD-10-CM

## 2024-08-16 DIAGNOSIS — J01.90 ACUTE BACTERIAL SINUSITIS: Primary | ICD-10-CM

## 2024-08-16 RX ORDER — AMOXICILLIN AND CLAVULANATE POTASSIUM 875; 125 MG/1; MG/1
1 TABLET, FILM COATED ORAL 2 TIMES DAILY
Qty: 14 TABLET | Refills: 0 | Status: SHIPPED | OUTPATIENT
Start: 2024-08-16 | End: 2024-08-23

## 2024-08-16 NOTE — PATIENT INSTRUCTIONS
Active.\"  Current as of: June 5, 2023  Content Version: 14.1  © 6920-3924 [employer].   Care instructions adapted under license by [employer]. If you have questions about a medical condition or this instruction, always ask your healthcare professional. [employer] disclaims any warranty or liability for your use of this information.           Starting a Weight Loss Plan: Care Instructions  Overview     It can be a challenge to lose weight. But your doctor can help you make a weight-loss plan that meets your needs.  You don't have to make a lot of big changes at once. A better idea might be to focus on small changes and stick with them. When those changes become habit, you can add a few more changes.  Some people find it helpful to take an exercise or nutrition class. If you have questions, ask your doctor about seeing a registered dietitian or an exercise specialist. You might also think about joining a weight-loss support group.  If you're not ready to make changes right now, try to pick a date in the future. Then make an appointment with your doctor to talk about when and how you'll get started with a plan.  Follow-up care is a key part of your treatment and safety. Be sure to make and go to all appointments, and call your doctor if you are having problems. It's also a good idea to know your test results and keep a list of the medicines you take.  How can you care for yourself at home?  Set realistic goals. Many people expect to lose much more weight than is likely. A weight loss of 5% to 10% of your body weight may be enough to improve your health.  Get family and friends involved to provide support. Talk to them about why you are trying to lose weight, and ask them to help. They can help by participating in exercise and having meals with you, even if they may be eating something different.  Find what works best for you. If you do not have time or do not like to cook, a program that

## 2024-08-17 ASSESSMENT — ENCOUNTER SYMPTOMS
SHORTNESS OF BREATH: 0
SINUS PRESSURE: 1
COUGH: 1

## 2024-08-17 NOTE — PROGRESS NOTES
Subjective     Chief Complaint   Patient presents with    URI     Sneezing, coughing, drainage, smelling fish 1 week.        Patient is 59 year old female presenting with sneezing, sinus drainage and sometimes productive cough.  She denies fever or chills.  She reports symptoms have been ongoing for one week after returning from the beach.  She reports sensation of smelling \"fish\".   She has history of sinus infections.      URI   Associated symptoms include congestion and coughing.       Past Medical History:   Diagnosis Date    Menopause     LMP-43 years old       Past Surgical History:   Procedure Laterality Date    GYN      hysterectomy    HYSTERECTOMY (CERVIX STATUS UNKNOWN)      LMP-43 years old       Family History   Problem Relation Age of Onset    Breast Cancer Mother 65    Hypertension Mother     Cancer Mother         breast    Hypertension Father     High Cholesterol Father     Cancer Paternal Aunt         cervical       Allergies   Allergen Reactions    Banana      Other reaction(s): Unknown (comments)    Oxycodone Hallucinations    Shellfish Allergy      Other reaction(s): Unknown (comments)    Sulfa Antibiotics      Other reaction(s): Unknown (comments)  Other reaction(s): Other (see comments)       Social History     Tobacco Use    Smoking status: Former     Current packs/day: 0.00     Types: Cigarettes     Quit date: 2013     Years since quittin.6    Smokeless tobacco: Never   Substance Use Topics    Alcohol use: No    Drug use: No       Vitals:    24 1852   BP: (!) 160/91   Pulse:    Resp:    Temp:    SpO2:        Review of Systems   Constitutional:  Negative for chills and fever.   HENT:  Positive for congestion, postnasal drip and sinus pressure.    Respiratory:  Positive for cough. Negative for shortness of breath.        Objective     Physical Exam  Vitals and nursing note reviewed.   Constitutional:       General: She is not in acute distress.     Appearance: Normal appearance.

## 2024-10-21 ENCOUNTER — HOSPITAL ENCOUNTER (OUTPATIENT)
Facility: HOSPITAL | Age: 59
Discharge: HOME | End: 2024-10-24
Attending: FAMILY MEDICINE
Payer: COMMERCIAL

## 2024-10-21 DIAGNOSIS — Z12.31 VISIT FOR SCREENING MAMMOGRAM: ICD-10-CM

## 2024-10-21 PROCEDURE — 77063 BREAST TOMOSYNTHESIS BI: CPT
